# Patient Record
Sex: FEMALE | Race: WHITE | NOT HISPANIC OR LATINO | URBAN - METROPOLITAN AREA
[De-identification: names, ages, dates, MRNs, and addresses within clinical notes are randomized per-mention and may not be internally consistent; named-entity substitution may affect disease eponyms.]

---

## 2024-04-28 ENCOUNTER — EMERGENCY (EMERGENCY)
Facility: HOSPITAL | Age: 64
LOS: 1 days | Discharge: HOME | End: 2024-04-28
Attending: STUDENT IN AN ORGANIZED HEALTH CARE EDUCATION/TRAINING PROGRAM | Admitting: STUDENT IN AN ORGANIZED HEALTH CARE EDUCATION/TRAINING PROGRAM
Payer: COMMERCIAL

## 2024-04-28 VITALS
HEART RATE: 89 BPM | SYSTOLIC BLOOD PRESSURE: 134 MMHG | OXYGEN SATURATION: 98 % | DIASTOLIC BLOOD PRESSURE: 83 MMHG | TEMPERATURE: 98 F | RESPIRATION RATE: 18 BRPM

## 2024-04-28 VITALS
DIASTOLIC BLOOD PRESSURE: 56 MMHG | OXYGEN SATURATION: 97 % | TEMPERATURE: 98 F | SYSTOLIC BLOOD PRESSURE: 115 MMHG | HEART RATE: 65 BPM | RESPIRATION RATE: 17 BRPM

## 2024-04-28 LAB
ALBUMIN SERPL ELPH-MCNC: 3.6 G/DL — SIGNIFICANT CHANGE UP (ref 3.3–5)
ALP SERPL-CCNC: 91 U/L — SIGNIFICANT CHANGE UP (ref 40–120)
ALT FLD-CCNC: 15 U/L — SIGNIFICANT CHANGE UP (ref 4–33)
ANION GAP SERPL CALC-SCNC: 15 MMOL/L — HIGH (ref 7–14)
APTT BLD: 25.7 SEC — SIGNIFICANT CHANGE UP (ref 24.5–35.6)
AST SERPL-CCNC: 16 U/L — SIGNIFICANT CHANGE UP (ref 4–32)
BASOPHILS # BLD AUTO: 0.06 K/UL — SIGNIFICANT CHANGE UP (ref 0–0.2)
BASOPHILS NFR BLD AUTO: 0.6 % — SIGNIFICANT CHANGE UP (ref 0–2)
BILIRUB SERPL-MCNC: <0.2 MG/DL — SIGNIFICANT CHANGE UP (ref 0.2–1.2)
BUN SERPL-MCNC: 25 MG/DL — HIGH (ref 7–23)
CALCIUM SERPL-MCNC: 8.8 MG/DL — SIGNIFICANT CHANGE UP (ref 8.4–10.5)
CHLORIDE SERPL-SCNC: 104 MMOL/L — SIGNIFICANT CHANGE UP (ref 98–107)
CO2 SERPL-SCNC: 21 MMOL/L — LOW (ref 22–31)
CREAT SERPL-MCNC: 1.32 MG/DL — HIGH (ref 0.5–1.3)
EGFR: 45 ML/MIN/1.73M2 — LOW
EOSINOPHIL # BLD AUTO: 0.14 K/UL — SIGNIFICANT CHANGE UP (ref 0–0.5)
EOSINOPHIL NFR BLD AUTO: 1.4 % — SIGNIFICANT CHANGE UP (ref 0–6)
FLUAV AG NPH QL: SIGNIFICANT CHANGE UP
FLUBV AG NPH QL: SIGNIFICANT CHANGE UP
GLUCOSE SERPL-MCNC: 128 MG/DL — HIGH (ref 70–99)
HCT VFR BLD CALC: 33.2 % — LOW (ref 34.5–45)
HGB BLD-MCNC: 10.1 G/DL — LOW (ref 11.5–15.5)
IANC: 7.73 K/UL — HIGH (ref 1.8–7.4)
IMM GRANULOCYTES NFR BLD AUTO: 0.7 % — SIGNIFICANT CHANGE UP (ref 0–0.9)
INR BLD: 0.98 RATIO — SIGNIFICANT CHANGE UP (ref 0.85–1.18)
LYMPHOCYTES # BLD AUTO: 1.5 K/UL — SIGNIFICANT CHANGE UP (ref 1–3.3)
LYMPHOCYTES # BLD AUTO: 15.1 % — SIGNIFICANT CHANGE UP (ref 13–44)
MCHC RBC-ENTMCNC: 23.9 PG — LOW (ref 27–34)
MCHC RBC-ENTMCNC: 30.4 GM/DL — LOW (ref 32–36)
MCV RBC AUTO: 78.5 FL — LOW (ref 80–100)
MONOCYTES # BLD AUTO: 0.43 K/UL — SIGNIFICANT CHANGE UP (ref 0–0.9)
MONOCYTES NFR BLD AUTO: 4.3 % — SIGNIFICANT CHANGE UP (ref 2–14)
NEUTROPHILS # BLD AUTO: 7.73 K/UL — HIGH (ref 1.8–7.4)
NEUTROPHILS NFR BLD AUTO: 77.9 % — HIGH (ref 43–77)
NRBC # BLD: 0 /100 WBCS — SIGNIFICANT CHANGE UP (ref 0–0)
NRBC # FLD: 0 K/UL — SIGNIFICANT CHANGE UP (ref 0–0)
PLATELET # BLD AUTO: 338 K/UL — SIGNIFICANT CHANGE UP (ref 150–400)
POTASSIUM SERPL-MCNC: 3.5 MMOL/L — SIGNIFICANT CHANGE UP (ref 3.5–5.3)
POTASSIUM SERPL-SCNC: 3.5 MMOL/L — SIGNIFICANT CHANGE UP (ref 3.5–5.3)
PROT SERPL-MCNC: 6.8 G/DL — SIGNIFICANT CHANGE UP (ref 6–8.3)
PROTHROM AB SERPL-ACNC: 11.1 SEC — SIGNIFICANT CHANGE UP (ref 9.5–13)
RBC # BLD: 4.23 M/UL — SIGNIFICANT CHANGE UP (ref 3.8–5.2)
RBC # FLD: 15.9 % — HIGH (ref 10.3–14.5)
RSV RNA NPH QL NAA+NON-PROBE: SIGNIFICANT CHANGE UP
SARS-COV-2 RNA SPEC QL NAA+PROBE: SIGNIFICANT CHANGE UP
SODIUM SERPL-SCNC: 140 MMOL/L — SIGNIFICANT CHANGE UP (ref 135–145)
WBC # BLD: 9.93 K/UL — SIGNIFICANT CHANGE UP (ref 3.8–10.5)
WBC # FLD AUTO: 9.93 K/UL — SIGNIFICANT CHANGE UP (ref 3.8–10.5)

## 2024-04-28 PROCEDURE — 99285 EMERGENCY DEPT VISIT HI MDM: CPT

## 2024-04-28 PROCEDURE — 70496 CT ANGIOGRAPHY HEAD: CPT | Mod: 26,MC

## 2024-04-28 PROCEDURE — 93010 ELECTROCARDIOGRAM REPORT: CPT

## 2024-04-28 PROCEDURE — 70498 CT ANGIOGRAPHY NECK: CPT | Mod: 26,MC

## 2024-04-28 RX ORDER — METOCLOPRAMIDE HCL 10 MG
10 TABLET ORAL ONCE
Refills: 0 | Status: COMPLETED | OUTPATIENT
Start: 2024-04-28 | End: 2024-04-28

## 2024-04-28 RX ORDER — SODIUM CHLORIDE 9 MG/ML
1000 INJECTION INTRAMUSCULAR; INTRAVENOUS; SUBCUTANEOUS ONCE
Refills: 0 | Status: COMPLETED | OUTPATIENT
Start: 2024-04-28 | End: 2024-04-28

## 2024-04-28 RX ADMIN — SODIUM CHLORIDE 1000 MILLILITER(S): 9 INJECTION INTRAMUSCULAR; INTRAVENOUS; SUBCUTANEOUS at 17:35

## 2024-04-28 RX ADMIN — Medication 10 MILLIGRAM(S): at 17:34

## 2024-04-28 NOTE — ED PROVIDER NOTE - PATIENT PORTAL LINK FT
You can access the FollowMyHealth Patient Portal offered by [facility] by registering at the following website: [email or web address]. By joining [employer]’s FollowMyHealth portal, you will also be able to view your health information using other applications (apps) compatible with our system.

## 2024-04-28 NOTE — ED ADULT TRIAGE NOTE - CHIEF COMPLAINT QUOTE
pt living with DM type2, currently taking Z pack for upper respiratory infection c/o of diffuse abd pain with nausea and vomiting since this afternoon,

## 2024-04-28 NOTE — ED PROVIDER NOTE - CLINICAL SUMMARY MEDICAL DECISION MAKING FREE TEXT BOX
64 y/o female pmh htn, hld, dm, obesity c/o room spinning dizziness x today, with feeling like she is falling to her R side. DIzines started after sitting up form lying down and is worse with looking side to side. Pt admits to nausea and vomiting as well. Pt denies slurred speech, neck pain, change in vision, syncope, fever or chills. Pt is well appearing, nad, afebrile, no neuro deficits, [place] neg, finger to nose neg, pronator drift neg, able to ambulate on her own, no ataxia on exam. + L horizontal nystagmus- symptoms consistent with peripheral vertigo however due to age and risk factors the risk on a central etiology is high. WIll check labs, CTA head/neck, symptomatic control, neuro consult and reassess.

## 2024-04-28 NOTE — CONSULT NOTE ADULT - SUBJECTIVE AND OBJECTIVE BOX
~~~~~~~~~~~~~~~~~~~~~~~~~~~~~~~~~~~~~~~~~~~~~~~~~~  Neurology Service   [facility] Consult [phone], Spectra [id number]  [facility] General Neurology- Spectra [phone]  [facility] Epilepsy Monitoring Unit- Spectra   [facility] Stroke Service- spectra [phone]  [facility] Consult Service [phone], [phone]  [facility] Stroke Consult Service , [phone] (7a-7p, otherwise call [phone])  ~~~~~~~~~~~~~~~~~~~~~~~~~~~~~~~~~~~~~~~~~~~~~~~~~~    History:  HPI:   MINA JANG is a 63y  Woman with PMHx Asthma, COPD, HTN, DM, chronic sinusitis, LEFT ear pain/infection on azithromycin a/w LEFT cheek pain, RIGHT gangliocapsular lacunar (on aspirin and atorvastatin), who presents for vertigo now resolved. Ongoing LEFT ear in    note incomlete        Review of Systems:    INCOMPLETE    CONSTITUTIONAL: No fevers or chills  EYES AND ENT: No visual changes or no throat pain   NECK: No pain or stiffness  RESPIRATORY: No hemoptysis or shortness of breath  CARDIOVASCULAR: No chest pain or palpitations  GASTROINTESTINAL: No melena or hematochezia  GENITOURINARY: No dysuria or hematuria  NEUROLOGICAL: +As stated in HPI above  SKIN: No itching, burning, rashes, or lesions   All other review of systems is negative unless indicated above.    PMHx:  HTN (hypertension)  HLD (hyperlipidemia)  DM (diabetes mellitus)        Social History:  INCOMPLETE  Lives with:  Alcohol use:  Tobacco use:   Other drug use:  Profession:  ADLs: Independent / Dependent for ___    Medications  Home Medications:    MEDICATIONS  (STANDING):    MEDICATIONS  (PRN):      Exam:  Vitals:  T(C): 36.7 (04-28-24 @ 16:09), Max: 36.7 (04-28-24 @ 16:09)  T(F): 98 (04-28-24 @ 16:09), Max: 98 (04-28-24 @ 16:09)  HR: 89 (04-28-24 @ 16:09) (89 - 89)  BP: 134/83 (04-28-24 @ 16:09) (134/83 - 134/83)  RR: 18 (04-28-24 @ 16:09) (18 - 18)  SpO2: 98% (04-28-24 @ 16:09) (98% - 98%)  I&O's Summary      INCOMPLETE  Physical and Neurological Examination:   - General:    - Mental Status:   Alert, awake, oriented to person, age, place, and time  speech is fluent with intact naming, repetition, and follows 1-step and 3-step mid-line crossing commands  good overall fund of knowledge (aware of current events, relevant past history, and vocabulary appropriate for level of education)  immediate recall is 3/3 words and delayed recall is 3/3 words at 5 minutes; able to spell WORLD backwards and recite the days of the week in reverse  able to read a sentence.    - Cranial Nerves:   PERRL, VFF, EOMI, facial sensation is intact in the V1-V3 distribution bilaterally; face is symmetric with normal eye closure and smile; hearing is intact to finger rub bilaterally and equally; uvula is midline and soft palate rises symmetrically; shoulder shrug intact; tongue protrudes in the midline with intact lateral movements    -Eyes  No ptosis  PERRL, no afferent pupillary defect appreciated  EOMI without nystagmus, no dysconjugate gaze. No pain noted on EOM  No diplopia on primary or lateral gaze  Acuity: OS 20/20, OD 20/20, OU 20/20.  Red desaturation: None  Fundi: Not well visualized     - Motor Testing:  Bulk:  Tone:  Strength:  There is no pronator drift b/l  There is no leg drift b/l                              Right           Left  Should-Abduct      5                   5  Elbow-Flex             5                   5  Elbow-Ext              5                   5  Wrist-Flex              5                   5  Wrist-Ext               5                   5  Interossei              5                   5                        5                   5    Hip-Flex                 5                   5  Hip-Ext                  5                   5  Knee-Flex              5                   5  Knee-Ext                5                   5  Dorsiflex                5                   5  Plantarflex             5                   5    - Reflexes:              Right           Left  Triceps                     2+                2+  Biceps                      2+                 2+  Brachioradialis         2+                 2+  Patellar                    2+                 2+  Achilles                    2+                 2+  Plant responses down bilaterally.  There is no lyle bilaterally    - Sensory: Intact throughout to light touch.   - Coordination: Finger-nose-finger intact without dysmetria.    - Gait: Normal steps, base, arm swing, and turning.      Objective Studies  Labs:             10.1   9.93  )-----------( 338      ( 28 Apr 2024 17:28 )             33.2   140  |  104  |  25<H>  ----------------------------<  128<H>  3.5   |  21<L>  |  1.32<H>  Ca    8.8      28 Apr 2024 17:28  TPro  6.8  /  Alb  3.6  /  TBili  <0.2  /  DBili  x   /  AST  16  /  ALT  15  /  AlkPhos  91  04-28  PT/INR - ( 28 Apr 2024 17:28 )   PT: 11.1 sec;   INR: 0.98 ratio    PTT - ( 28 Apr 2024 17:28 )  PTT:25.7 sec  Lactate Trend  CAPILLARY BLOOD GLUCOSE  POCT Blood Glucose.: 149 mg/dL (28 Apr 2024 16:09)       CNS Imaging:  < from: CT Angio Neck w/ IV Cont (04.28.24 @ 19:32) >    ACC: [id number] EXAM:  CT ANGIO NECK (W)AW IC   ORDERED BY: SARBJIT COX     ACC: [id number] EXAM:  CT ANGIO BRAIN (W)AW IC   ORDERED BY: SARBJIT COX     PROCEDURE DATE:  04/28/2024          INTERPRETATION:  CLINICAL HISTORY: Dizziness.    TECHNIQUE:  Noncontrast head CT was followed by CT images acquired through the  neck   and head  during the arterial phase.  Three-dimensional MIP reformats were generated.  Additional postcontrast CT through the head was obtained.    CONTRAST/COMPLICATIONS:  IV Contrast: Omnipaque 350  94 cc administered   6 cc discarded  Complications: None reported at time of study completion    COMPARISON STUDY: Noncontrast CT head of 6/20/2010.    FINDINGS:    NONCONTRAST CT HEAD:    There is no acute intracranial hemorrhage, mass effect, midline shift,   extra-axial collection, hydrocephalus, or evidence of acute vascular   territorial infarction. Chronic appearing right gangliocapsular lacunar   infarct. Mild patchy hypodensities within the periventricular and   subcortical white matter, although nonspecific, likely reflect chronic   microvascular disease. Cerebral volume loss contributes to prominence of   the ventricles and sulci.    The visualized paranasal sinuses and mastoid air cells are clear.   Visualized osseous structures are intact.    CT ANGIOGRAPHY NECK:    Thoracic aorta and branch vessels: Patent.  No atherosclerosis.  No   flow-limiting stenosis.  No evidence of dissection.    Right carotid system: Patent.  No atherosclerosis.  No hemodynamically   significant stenosis using NASCET criteria.  No evidence of dissection.    Left carotid system: Patent. Atherosclerotic calcifications at the   carotid bifurcation.  No hemodynamically significant stenosis using   NASCET criteria.  No evidence of dissection.    Vertebral arteries: Patent.  No atherosclerosis.  No flow-limiting   stenosis.  No evidence of dissection.    Soft tissues of the neck: Calcified right thyroid lobe nodules measuring   up to 1.5 cm, evaluation of which is limited by streak artifact.    Visualized spine: Multilevel degenerative change.    Visualized upper chest: Unremarkable.    CT ANGIOGRAPHY BRAIN:    Internal carotid arteries: Patent bilaterally. Atherosclerotic   calcifications. No flow limiting stenosis.    Anterior cerebral arteries: Patent bilaterally without flow limiting   stenosis.    Middle cerebral arteries: Patent bilaterally without flow limiting   stenosis.    Anterior communicating artery: Visualized.    Right posterior communicating artery: Not Visualized.    Left posterior communicating artery: Not Visualized.    Posterior cerebral arteries: Patent bilaterally without stenosis.    Vertebrobasilar: Patent without stenosis. The distal vertebral arteries   are similar in caliber.  Bilateral posterior inferior cerebellar arteries   and bilateral superior cerebellar arteries are visualized.    Vascular lesions: No evidence of intracranial aneurysm within limits of   CTA technique.  Tiny aneurysms may be beyond the resolution of this   examination.    Dural venous sinuses: Grossly patent.    IMPRESSION:    Noncontrast CT Head: No acute intracranial hemorrhage, mass effect, or   evidence of acute vascular territorial infarct. If clinical symptoms   persist or worsen, more sensitive evaluation with brain MRI may be   obtained, if no contraindications exist.    CTA Neck: No significant flow-limiting stenosis or evidence of acute   dissection within the cervical carotid or vertebral arteries.    CTA Head: No proximal large vessel occlusion or significant stenosis.    --- End of Report ---            NATHAN KEITH MD; Attending Radiologist  This document has been electronically signed. Apr 28 2024  7:46PM    < end of copied text >      EEGs:    TTE:    NIF/VC:    Other:     ~~~~~~~~~~~~~~~~~~~~~~~~~~~~~~~~~~~~~~~~~~~~~~~~~~  Neurology Service   [facility] Consult [phone], Spectra [phone]  [facility] General Neurology- Spectra [id number]  [facility] Epilepsy Monitoring Unit- Spectra   [facility] Stroke Service- spectra [phone]  [facility] Consult Service [phone], [phone]  [facility] Stroke Consult Service , [phone] (7a-7p, otherwise call [phone])  ~~~~~~~~~~~~~~~~~~~~~~~~~~~~~~~~~~~~~~~~~~~~~~~~~~    History:  HPI:   MINA JANG is a 63y  Woman with PMHx Asthma, COPD, HTN, DM, chronic sinusitis, LEFT ear pain/infection on azithromycin a/w LEFT cheek pain, RIGHT gangliocapsular lacunar (on aspirin and atorvastatin), who presents for vertigo now resolved. Ongoing LEFT ear infection for several weeks a/w cheek pain on abx. 4/28/24 ~1500 got up form cough and had room spinning sensation, leaning to the RIGHT c/o LEFT body shaking which she describes as a  tremor Persisted 60-90 minutes and unable to walk at that time now improved requiesting to go home. Hx of vertigo lasting a few seconds intermittently for years. Notes LEFT ear and cheek pain.    ED course: VSS, orthostatics negative. Tx w/ NS and metoclopramide.     Review of Systems:    CONSTITUTIONAL: No fevers or chills  EYES AND ENT: No visual changes or no throat pain   NECK: No pain or stiffness  RESPIRATORY: No hemoptysis or shortness of breath  CARDIOVASCULAR: No chest pain or palpitations  GASTROINTESTINAL: No melena or hematochezia  GENITOURINARY: No dysuria or hematuria  NEUROLOGICAL: +As stated in HPI above  SKIN: No itching, burning, rashes, or lesions   All other review of systems is negative unless indicated above.    PMHx:  HTN (hypertension)  HLD (hyperlipidemia)  DM (diabetes mellitus)      Social History:  denies t/e/d   9 months ago        Exam:  Vitals:  T(C): 36.7 (04-28-24 @ 16:09), Max: 36.7 (04-28-24 @ 16:09)  T(F): 98 (04-28-24 @ 16:09), Max: 98 (04-28-24 @ 16:09)  HR: 89 (04-28-24 @ 16:09) (89 - 89)  BP: 134/83 (04-28-24 @ 16:09) (134/83 - 134/83)  RR: 18 (04-28-24 @ 16:09) (18 - 18)  SpO2: 98% (04-28-24 @ 16:09) (98% - 98%)  I&O's Summary    Physical and Neurological Examination:   - General: NAD    - Mental Status:   Alert, awake, oriented to person, age, place, and time  speech is fluent with intact naming, repetition, and follows 1-step and 3-step mid-line crossing commands  good overall fund of knowledge (aware of current events, relevant past history, and vocabulary appropriate for level of education)    - Cranial Nerves:   PERRL, VFF, EOMI with right and right rotary nystagmus worse on right gaze, facial sensation is intact in the V1-V3 distribution bilaterally except for around left ear where sensation is intact but pain is noted; face is symmetric with normal smile; hearing is intact to finger rub bilaterally and equally; uvula is midline and soft palate rises symmetrically; shoulder shrug intact; tongue protrudes in the midline with intact lateral movements. Fundi not well visualized on nondilated exam in bright ED.    - Motor Testing:  Bulk: Normal  Tone: Normal  Strength:  There is no pronator drift b/l                              Right           Left  Should-Abduct      5                   5  Elbow-Flex             5                   5  Elbow-Ext              5                   5                        5                   5    Hip-Flex                 5                   5  Knee-Flex              5                   5  Knee-Ext                5                   5  Dorsiflex                5                   5  Plantarflex             5                   5    - Reflexes:              Right           Left  Triceps                     2+                2+  Biceps                      2+                 2+  Brachioradialis         2+                 2+  Patellar                    2+                 2+  Achilles                    2+                 2+    - Sensory: Intact throughout to light touch.   - Coordination: Finger-nose-finger intact without dysmetria.  Intact rapid finger rapping, intact rapid alt movement  - Gait: Normal steps, base, arm swing, and turning.      Objective Studies  Labs:             10.1   9.93  )-----------( 338      ( 28 Apr 2024 17:28 )             33.2   140  |  104  |  25<H>  ----------------------------<  128<H>  3.5   |  21<L>  |  1.32<H>  Ca    8.8      28 Apr 2024 17:28  TPro  6.8  /  Alb  3.6  /  TBili  <0.2  /  DBili  x   /  AST  16  /  ALT  15  /  AlkPhos  91  04-28  PT/INR - ( 28 Apr 2024 17:28 )   PT: 11.1 sec;   INR: 0.98 ratio    PTT - ( 28 Apr 2024 17:28 )  PTT:25.7 sec  Lactate Trend  CAPILLARY BLOOD GLUCOSE  POCT Blood Glucose.: 149 mg/dL (28 Apr 2024 16:09)       CNS Imaging:  < from: CT Angio Neck w/ IV Cont (04.28.24 @ 19:32) >    ACC: [id number] EXAM:  CT ANGIO NECK (W)AW IC   ORDERED BY: SARBJIT COX     ACC: [id number] EXAM:  CT ANGIO BRAIN (W)AW IC   ORDERED BY: SARBJIT COX     PROCEDURE DATE:  04/28/2024          INTERPRETATION:  CLINICAL HISTORY: Dizziness.    TECHNIQUE:  Noncontrast head CT was followed by CT images acquired through the  neck   and head  during the arterial phase.  Three-dimensional MIP reformats were generated.  Additional postcontrast CT through the head was obtained.    CONTRAST/COMPLICATIONS:  IV Contrast: Omnipaque 350  94 cc administered   6 cc discarded  Complications: None reported at time of study completion    COMPARISON STUDY: Noncontrast CT head of 6/20/2010.    FINDINGS:    NONCONTRAST CT HEAD:    There is no acute intracranial hemorrhage, mass effect, midline shift,   extra-axial collection, hydrocephalus, or evidence of acute vascular   territorial infarction. Chronic appearing right gangliocapsular lacunar   infarct. Mild patchy hypodensities within the periventricular and   subcortical white matter, although nonspecific, likely reflect chronic   microvascular disease. Cerebral volume loss contributes to prominence of   the ventricles and sulci.    The visualized paranasal sinuses and mastoid air cells are clear.   Visualized osseous structures are intact.    CT ANGIOGRAPHY NECK:    Thoracic aorta and branch vessels: Patent.  No atherosclerosis.  No   flow-limiting stenosis.  No evidence of dissection.    Right carotid system: Patent.  No atherosclerosis.  No hemodynamically   significant stenosis using NASCET criteria.  No evidence of dissection.    Left carotid system: Patent. Atherosclerotic calcifications at the   carotid bifurcation.  No hemodynamically significant stenosis using   NASCET criteria.  No evidence of dissection.    Vertebral arteries: Patent.  No atherosclerosis.  No flow-limiting   stenosis.  No evidence of dissection.    Soft tissues of the neck: Calcified right thyroid lobe nodules measuring   up to 1.5 cm, evaluation of which is limited by streak artifact.    Visualized spine: Multilevel degenerative change.    Visualized upper chest: Unremarkable.    CT ANGIOGRAPHY BRAIN:    Internal carotid arteries: Patent bilaterally. Atherosclerotic   calcifications. No flow limiting stenosis.    Anterior cerebral arteries: Patent bilaterally without flow limiting   stenosis.    Middle cerebral arteries: Patent bilaterally without flow limiting   stenosis.    Anterior communicating artery: Visualized.    Right posterior communicating artery: Not Visualized.    Left posterior communicating artery: Not Visualized.    Posterior cerebral arteries: Patent bilaterally without stenosis.    Vertebrobasilar: Patent without stenosis. The distal vertebral arteries   are similar in caliber.  Bilateral posterior inferior cerebellar arteries   and bilateral superior cerebellar arteries are visualized.    Vascular lesions: No evidence of intracranial aneurysm within limits of   CTA technique.  Tiny aneurysms may be beyond the resolution of this   examination.    Dural venous sinuses: Grossly patent.    IMPRESSION:    Noncontrast CT Head: No acute intracranial hemorrhage, mass effect, or   evidence of acute vascular territorial infarct. If clinical symptoms   persist or worsen, more sensitive evaluation with brain MRI may be   obtained, if no contraindications exist.    CTA Neck: No significant flow-limiting stenosis or evidence of acute   dissection within the cervical carotid or vertebral arteries.    CTA Head: No proximal large vessel occlusion or significant stenosis.    --- End of Report ---            NATHAN KEITH MD; Attending Radiologist  This document has been electronically signed. Apr 28 2024  7:46PM    < end of copied text >      EEGs:    TTE:    NIF/VC:    Other:     ~~~~~~~~~~~~~~~~~~~~~~~~~~~~~~~~~~~~~~~~~~~~~~~~~~  Neurology Service   [facility] Consult [phone], Spectra [id number]  [facility] General Neurology- Spectra [phone]  [facility] Epilepsy Monitoring Unit- Spectra   [facility] Stroke Service- spectra [phone]  [facility] Consult Service [phone], [phone]  [facility] Stroke Consult Service , Spectra-[phone] (7a-7p, otherwise call [phone])  ~~~~~~~~~~~~~~~~~~~~~~~~~~~~~~~~~~~~~~~~~~~~~~~~~~    History:  HPI:   MINA JANG is a 63y  Woman with PMHx Asthma, COPD, HTN, DM, chronic sinusitis, LEFT ear pain/infection on azithromycin a/w LEFT cheek pain, RIGHT gangliocapsular lacunar (on aspirin and atorvastatin), who presents for vertigo now resolved. Ongoing LEFT ear infection for several weeks a/w cheek pain on abx. 4/28/24 ~1500 got up form cough and had room spinning sensation, leaning to the RIGHT c/o LEFT body shaking which she describes as a  tremor Persisted 60-90 minutes and unable to walk at that time now improved requiesting to go home. Hx of vertigo lasting a few seconds intermittently for years. Notes LEFT ear and cheek pain.    ED course: VSS, orthostatics negative. Tx w/ NS and metoclopramide.     Review of Systems:    CONSTITUTIONAL: No fevers or chills  EYES AND ENT: No visual changes or no throat pain   NECK: No pain or stiffness  RESPIRATORY: No hemoptysis or shortness of breath  CARDIOVASCULAR: No chest pain or palpitations  GASTROINTESTINAL: No melena or hematochezia  GENITOURINARY: No dysuria or hematuria  NEUROLOGICAL: +As stated in HPI above  SKIN: No itching, burning, rashes, or lesions   All other review of systems is negative unless indicated above.    PMHx:  HTN (hypertension)  HLD (hyperlipidemia)  DM (diabetes mellitus)      Social History:  denies t/e/d   9 months ago        Exam:  Vitals:  T(C): 36.7 (04-28-24 @ 16:09), Max: 36.7 (04-28-24 @ 16:09)  T(F): 98 (04-28-24 @ 16:09), Max: 98 (04-28-24 @ 16:09)  HR: 89 (04-28-24 @ 16:09) (89 - 89)  BP: 134/83 (04-28-24 @ 16:09) (134/83 - 134/83)  RR: 18 (04-28-24 @ 16:09) (18 - 18)  SpO2: 98% (04-28-24 @ 16:09) (98% - 98%)  I&O's Summary    Physical and Neurological Examination:   - General: NAD    - Mental Status:   Alert, awake, oriented to person, age, place, and time  speech is fluent with intact naming, repetition, and follows 1-step and 3-step mid-line crossing commands  good overall fund of knowledge (aware of current events, relevant past history, and vocabulary appropriate for level of education)    - Cranial Nerves:   PERRL, VFF, EOMI with right and right rotary nystagmus worse on right gaze, facial sensation is intact in the V1-V3 distribution bilaterally except for around left ear where sensation is intact but pain is noted; face is symmetric with normal smile; hearing is intact to finger rub bilaterally and equally; uvula is midline and soft palate rises symmetrically; shoulder shrug intact; tongue protrudes in the midline with intact lateral movements. Fundi not well visualized on nondilated exam in bright ED.    - Motor Testing:  Bulk: Normal  Tone: Normal  Strength:  There is no pronator drift b/l                              Right           Left  Should-Abduct      5                   5  Elbow-Flex             5                   5  Elbow-Ext              5                   5                        5                   5    Hip-Flex                 5                   5  Knee-Flex              5                   5  Knee-Ext                5                   5  Dorsiflex                5                   5  Plantarflex             5                   5    - Reflexes:              Right           Left  Triceps                     2+                2+  Biceps                      2+                 2+  Brachioradialis         2+                 2+  Patellar                    2+                 2+  Achilles                    2+                 2+    - Sensory: Intact throughout to light touch.   - Coordination: Finger-nose-finger intact without dysmetria.  Intact rapid finger rapping, intact rapid alt movement. HKS intact.   - Gait: Normal steps, base, arm swing, and turning.      Objective Studies  Labs:             10.1   9.93  )-----------( 338      ( 28 Apr 2024 17:28 )             33.2   140  |  104  |  25<H>  ----------------------------<  128<H>  3.5   |  21<L>  |  1.32<H>  Ca    8.8      28 Apr 2024 17:28  TPro  6.8  /  Alb  3.6  /  TBili  <0.2  /  DBili  x   /  AST  16  /  ALT  15  /  AlkPhos  91  04-28  PT/INR - ( 28 Apr 2024 17:28 )   PT: 11.1 sec;   INR: 0.98 ratio    PTT - ( 28 Apr 2024 17:28 )  PTT:25.7 sec  Lactate Trend  CAPILLARY BLOOD GLUCOSE  POCT Blood Glucose.: 149 mg/dL (28 Apr 2024 16:09)       CNS Imaging:  < from: CT Angio Neck w/ IV Cont (04.28.24 @ 19:32) >    ACC: [id number] EXAM:  CT ANGIO NECK (W)AW IC   ORDERED BY: SARBJIT COX     ACC: [id number] EXAM:  CT ANGIO BRAIN (W)AW IC   ORDERED BY: SARBJIT COX     PROCEDURE DATE:  04/28/2024          INTERPRETATION:  CLINICAL HISTORY: Dizziness.    TECHNIQUE:  Noncontrast head CT was followed by CT images acquired through the  neck   and head  during the arterial phase.  Three-dimensional MIP reformats were generated.  Additional postcontrast CT through the head was obtained.    CONTRAST/COMPLICATIONS:  IV Contrast: Omnipaque 350  94 cc administered   6 cc discarded  Complications: None reported at time of study completion    COMPARISON STUDY: Noncontrast CT head of 6/20/2010.    FINDINGS:    NONCONTRAST CT HEAD:    There is no acute intracranial hemorrhage, mass effect, midline shift,   extra-axial collection, hydrocephalus, or evidence of acute vascular   territorial infarction. Chronic appearing right gangliocapsular lacunar   infarct. Mild patchy hypodensities within the periventricular and   subcortical white matter, although nonspecific, likely reflect chronic   microvascular disease. Cerebral volume loss contributes to prominence of   the ventricles and sulci.    The visualized paranasal sinuses and mastoid air cells are clear.   Visualized osseous structures are intact.    CT ANGIOGRAPHY NECK:    Thoracic aorta and branch vessels: Patent.  No atherosclerosis.  No   flow-limiting stenosis.  No evidence of dissection.    Right carotid system: Patent.  No atherosclerosis.  No hemodynamically   significant stenosis using NASCET criteria.  No evidence of dissection.    Left carotid system: Patent. Atherosclerotic calcifications at the   carotid bifurcation.  No hemodynamically significant stenosis using   NASCET criteria.  No evidence of dissection.    Vertebral arteries: Patent.  No atherosclerosis.  No flow-limiting   stenosis.  No evidence of dissection.    Soft tissues of the neck: Calcified right thyroid lobe nodules measuring   up to 1.5 cm, evaluation of which is limited by streak artifact.    Visualized spine: Multilevel degenerative change.    Visualized upper chest: Unremarkable.    CT ANGIOGRAPHY BRAIN:    Internal carotid arteries: Patent bilaterally. Atherosclerotic   calcifications. No flow limiting stenosis.    Anterior cerebral arteries: Patent bilaterally without flow limiting   stenosis.    Middle cerebral arteries: Patent bilaterally without flow limiting   stenosis.    Anterior communicating artery: Visualized.    Right posterior communicating artery: Not Visualized.    Left posterior communicating artery: Not Visualized.    Posterior cerebral arteries: Patent bilaterally without stenosis.    Vertebrobasilar: Patent without stenosis. The distal vertebral arteries   are similar in caliber.  Bilateral posterior inferior cerebellar arteries   and bilateral superior cerebellar arteries are visualized.    Vascular lesions: No evidence of intracranial aneurysm within limits of   CTA technique.  Tiny aneurysms may be beyond the resolution of this   examination.    Dural venous sinuses: Grossly patent.    IMPRESSION:    Noncontrast CT Head: No acute intracranial hemorrhage, mass effect, or   evidence of acute vascular territorial infarct. If clinical symptoms   persist or worsen, more sensitive evaluation with brain MRI may be   obtained, if no contraindications exist.    CTA Neck: No significant flow-limiting stenosis or evidence of acute   dissection within the cervical carotid or vertebral arteries.    CTA Head: No proximal large vessel occlusion or significant stenosis.    --- End of Report ---            NATHAN KEITH MD; Attending Radiologist  This document has been electronically signed. Apr 28 2024  7:46PM    < end of copied text >      EEGs:    TTE:    NIF/VC:    Other:

## 2024-04-28 NOTE — ED PROVIDER NOTE - NSFOLLOWUPINSTRUCTIONS_ED_ALL_ED_FT
Benign Paroxysmal Positional Vertigo    WHAT YOU NEED TO KNOW:    What is benign paroxysmal positional vertigo (BPPV)? BPPV is an inner ear condition that causes you to suddenly feel dizzy. Benign means it is not serious or life-threatening. BPPV is caused by a problem with the nerves and structure of your inner ear. BPPV happens when small pieces of calcium break loose and lump together in one of your inner ear canals.  Ear Anatomy    What are the signs and symptoms of BPPV? You may feel that you or the room is moving or spinning. Turning your head, rolling over in bed, getting up or lying down may lead to sudden vertigo. You may also have any of the following symptoms:    Nystagmus (quick shaky eye movement that you cannot control)    Nausea    Poor balance and feeling unsteady when you walk  What increases my risk for BPPV?    Older age    An injury or trauma to your head or neck    Frequent ear infections    Long-term bed rest    A medical condition such as diabetes, high blood pressure, migraine headaches, or Ménière disease  How is BPPV diagnosed? Your healthcare provider will ask about your symptoms and examine you. Your healthcare provider can usually determine if you have BPPV by doing a few simple tests. He or she may have you move your head or body in certain ways. Tell him or her if you feel dizzy or nauseated during these movements.    How is BPPV managed?    Your healthcare provider will teach you how to move your head and body to prevent symptoms. For example, he or she may teach you certain ways to move your head or body. These movements usually help relieve your symptoms and keep the dizziness from returning. The exercises help move the calcium pieces to a different part of your ear. Do the movements only as directed.    Vestibular and balance rehabilitation therapy (VBRT) is used to teach you exercises to improve your balance and strength. VBRT may help decrease your dizziness and prevent injuries if you are at risk for falls.    Medicines may be recommended or prescribed to treat dizziness or nausea.  How can I help prevent my symptoms?    Try to avoid sudden head movements. Stand up and lie down slowly.    Raise and support your head when you lie down. Place pillows under your upper back and head or rest in a recliner.    Change your position often when you are lying down. Try not to lie with your head on the same side for long periods of time. Roll over slowly.    Wear protective gear when you ride a bike or play sports. A helmet helps protect your head from injury.  When should I seek immediate care?    You fall during a BPPV episode and are injured.    You have a severe headache that does not go away.    You have new changes in your vision or feel weak or confused.    You have problems hearing, or you have ringing or buzzing in your ears.  When should I contact my healthcare provider?    Your BPPV symptoms do not go away or they return.    You have problems with your balance, or you are falling often.    You have new or increased nausea or vomiting with vertigo.    You feel anxious or depressed and do not want to leave your home.    You have questions or concerns about your condition or care.  CARE AGREEMENT:    You have the right to help plan your care. Learn about your health condition and how it may be treated. Discuss treatment options with your healthcare providers to decide what care you want to receive. You always have the right to refuse treatment.    Patient education resource: [email or web address]-[email or web address]

## 2024-04-28 NOTE — ED PROVIDER NOTE - NSFOLLOWUPCLINICS_GEN_ALL_ED_FT
[facility]  Neurology Auto[facility] Encephalitis  [address] [place] [address]  Phone: [phone]  Fax: [phone]

## 2024-04-28 NOTE — ED PROVIDER NOTE - OBJECTIVE STATEMENT
64 y/o female pmh htn, hld, dm, copd, obesity c/o dizziness x today. PT sat up on the cough and felt like she was falling to the right side. Pt tried to walk to the bathroom and felt the same way, having to support herself on the walls. Pt then had several episodes of emesis, nbnb. Pt also admit sto mild headache. Pt states that she has room spinning dizziness, worse with looking side to side. Pt denies cehst pain, sob, diarrhea, dysuria, numbness, tingling, weakness, neck pain, change in vision, syncope, fever or chills.

## 2024-04-28 NOTE — CONSULT NOTE ADULT - ASSESSMENT
Impression: Peripheral Vertigo, improved    Recommendations  []No further inpatient/emergent neurological workup/indicated. Neurology f/u outpatient; Patient can follow up with general neurology at [address] [place] [address] 1-2 weeks after discharge. Please instruct the patient to call [phone] to schedule this appointment.   [] Meclizine 12.5mg TID PRN (can take additional dose if refractory up to 50mg TID)  [] refer for Vestibular Rehab  [] ENT f/u outpatient  []Patient education resource: [email or web address]-[email or web address]    Discussed with attending   Prelim note to expedite recs/dischage. Recs communicated with primary team. Full note to follow Assessment:     VSS with negative orthostatics. Exam with right and right rotary nystagmus worse on right gaze. No cerebellar signs.   Labs with microcytic anemia new since 2013. Elevated sCr new since 2013. CTH with Chronic appearing right gangliocapsular lacunar infarct which patient reports she has been informed about previously by a neurologist on [facility]. Also with. Mild patchy hypodensities within the periventricular and subcortical white matter, although nonspecific, likely reflect chronic microvascular disease. Cerebral volume loss contributes to prominence of the ventricles and sulci. CTA left carotid bifurcation atherosclerosis. Calcified right thyroid nodule 1.cm. Degen changes in c spine.     Impression: Peripheral Vertigo, improved    Recommendations  []Anemia, elevated sCr, thyroid finding per primary   []No further inpatient/emergent neurological workup/indicated. Neurology f/u outpatient; Patient can follow up with general neurology at [address] [place] [address] 1-2 weeks after discharge. Please instruct the patient to call [phone] to schedule this appointment.   [] Meclizine 12.5mg TID PRN (can take additional dose if refractory up to 50mg TID)  [] refer for Vestibular Rehab  [] ENT f/u outpatient  []Patient education resource: [email or web address]-[email or web address]    Discussed with attending   Prelim note to expedite recs/dischage. Recs communicated with primary team. Full note to follow Assessment:   63W PMHx Asthma, COPD, HTN, DM, chronic sinusitis, Current LEFT ear pain/infection on azithromycin a/w LEFT cheek pain, RIGHT gangliocapsular lacunar (on aspirin and atorvastatin), who presents for vertigo now resolved. Tx in ED with NS 1L and metoclopramide VSS with negative orthostatics. Exam with right and right rotary nystagmus worse on right gaze. No cerebellar signs. Labs with microcytic anemia new since 2013. Elevated sCr new since 2013. CTH with Chronic appearing right gangliocapsular lacunar infarct which patient reports she has been informed about previously by a neurologist on [facility]. Also with. Mild patchy hypodensities within the periventricular and subcortical white matter, although nonspecific, likely reflect chronic microvascular disease. Cerebral volume loss contributes to prominence of the ventricles and sulci. CTA left carotid bifurcation atherosclerosis. Calcified right thyroid nodule 1.cm. Degen changes in c spine.     Impression: Peripheral Vertigo, improved    Recommendations  []Anemia, elevated sCr, thyroid finding per primary   []No further inpatient/emergent neurological workup/indicated. Neurology f/u outpatient; Patient can follow up with general neurology at [address] [place] [address] 1-2 weeks after discharge. Please instruct the patient to call [phone] to schedule this appointment.   []Meclizine 12.5mg TID PRN (can take additional dose if refractory up to 50mg TID)  []refer for Vestibular Rehab  []ENT f/u outpatient  []Patient education resource: [email or web address]-[email or web address]    Discussed with attending    Assessment:   63W PMHx Asthma, COPD, HTN, DM, chronic sinusitis, Current LEFT ear pain/infection on azithromycin a/w LEFT cheek pain, RIGHT gangliocapsular lacunar (on aspirin and atorvastatin), who presents for vertigo now resolved. Tx in ED with NS 1L and metoclopramide VSS with negative orthostatics. Exam with right and right rotary nystagmus worse on right gaze. No cerebellar signs. Labs with microcytic anemia new since 2013. Elevated sCr new since 2013. CTH with Chronic appearing right gangliocapsular lacunar infarct which patient reports she has been informed about previously by a neurologist on [facility]. Also with. Mild patchy hypodensities within the periventricular and subcortical white matter, although nonspecific, likely reflect chronic microvascular disease. Cerebral volume loss contributes to prominence of the ventricles and sulci. CTA left carotid bifurcation atherosclerosis. Calcified right thyroid nodule 1.cm. Degen changes in c spine.     Impression: Peripheral Vertigo, improved    Recommendations  []Anemia, elevated sCr, thyroid finding per primary   []No further inpatient/emergent neurological workup/indicated. Neurology f/u outpatient; Patient can follow up with general neurology at [address] [place] [address] 1-2 weeks after discharge. Please instruct the patient to call [phone] to schedule this appointment.   []Meclizine 12.5mg TID PRN (can take additional dose if refractory up to 50mg TID)  []refer for Vestibular Rehab  []ENT f/u outpatient  []Patient education resource: [email or web address]-[email or web address]-[email or web address]/    Discussed with attending     Attending Attestation:  The patient left the hospital prior to being seen by me.

## 2024-04-28 NOTE — ED ADULT NURSE NOTE - NSFALLHARMRISKINTERV_ED_ALL_ED
Assistance OOB with selected safe patient handling equipment if applicable/Assistance with ambulation/Communicate risk of Fall with Harm to all staff, patient, and family/Monitor gait and stability/Provide visual cue: red socks, yellow wristband, yellow gown, etc/Reinforce activity limits and safety measures with patient and family/Use of alarms - bed, stretcher, chair and/or video monitoring/Bed in lowest position, wheels locked, appropriate side rails in place/Call bell, personal items and telephone in reach/Instruct patient to call for assistance before getting out of bed/chair/stretcher/Non-slip footwear applied when patient is off stretcher/[place] to call system/Physically safe environment - no spills, clutter or unnecessary equipment/Purposeful Proactive Rounding/Room/bathroom lighting operational, light cord in reach

## 2024-04-28 NOTE — ED PROVIDER NOTE - PROGRESS NOTE DETAILS
JEFRY Talavera- Pt feeling better after ER stay, Pt seen and eval by neuro ,cleared for dc. WIll dc on meclizine, vestibular therapy.

## 2024-04-28 NOTE — ED PROVIDER NOTE - ATTENDING APP SHARED VISIT CONTRIBUTION OF CARE
I performed a face to face evaluation of this patient and obtained a history and performed a full exam.  I agree with the history, physical exam and plan of the DANYEL

## 2024-04-28 NOTE — ED ADULT NURSE NOTE - OBJECTIVE STATEMENT
Received pt in bed A and OX  3in NAD resting comfortably, PERRLA extremities are strong and equal B/l. pt reports had a syncopal episode today and felt generalized weakness with episode fo N/V. some emesis noted on pt's clothing. pt denies abd pain. abd distended but soft non tender, skin color appropriate for ethnicity, orders noted and completed.

## 2024-04-30 PROBLEM — E78.5 HYPERLIPIDEMIA, UNSPECIFIED: Chronic | Status: ACTIVE | Noted: 2024-04-28

## 2024-04-30 PROBLEM — I10 ESSENTIAL (PRIMARY) HYPERTENSION: Chronic | Status: ACTIVE | Noted: 2024-04-28

## 2024-04-30 PROBLEM — E11.9 TYPE 2 DIABETES MELLITUS WITHOUT COMPLICATIONS: Chronic | Status: ACTIVE | Noted: 2024-04-28

## 2024-04-30 RX ORDER — MECLIZINE HCL 12.5 MG
1 TABLET ORAL
Qty: 15 | Refills: 0
Start: 2024-04-30 | End: 2024-05-04

## 2024-04-30 NOTE — ED POST DISCHARGE NOTE - ADDITIONAL DOCUMENTATION
JEFRY Calvo: Pt called, states prescription is not at her pharmacy, ED note documents meclizine to be sent to pts pharmacy, sent rx for meclizine. Pt will return to the ER with any worsening or concerning symptoms,

## 2024-05-20 ENCOUNTER — APPOINTMENT (OUTPATIENT)
Dept: NEUROLOGY | Facility: CLINIC | Age: 64
End: 2024-05-20
Payer: COMMERCIAL

## 2024-05-20 VITALS
WEIGHT: 265 LBS | DIASTOLIC BLOOD PRESSURE: 76 MMHG | SYSTOLIC BLOOD PRESSURE: 135 MMHG | HEART RATE: 80 BPM | BODY MASS INDEX: 44.15 KG/M2 | HEIGHT: 65 IN

## 2024-05-20 DIAGNOSIS — R42 DIZZINESS AND GIDDINESS: ICD-10-CM

## 2024-05-20 PROCEDURE — 99205 OFFICE O/P NEW HI 60 MIN: CPT

## 2024-05-20 NOTE — PHYSICAL EXAM
[FreeTextEntry1] : PHYSICAL EXAM Constitutional: Alert, no acute distress  Psychiatric: appropriate affect and mood Pulmonary: No respiratory distress, stable on room air  NEUROLOGICAL EXAM Mental status: The patient is alert, attentive and conversational memory intact. Speech/language: No dysarthria Cranial nerves: CN II: Visual fields are full to confrontation. Pupil size equal and briskly reactive to light.  CN III, IV, VI: EOMI, no nystagmus, no ptosis CN V: Facial sensation is intact to pinprick in all 3 divisions bilaterally. CN VII: Face is symmetric with normal eye closure and smile. CN VII: Hearing is normal to rubbing fingers CN IX, X: Palate elevates symmetrically.  CN XI: Head turning and shoulder shrug are intact CN XII: Tongue is midline with normal movements and no atrophy. Motor: Strength is full bilaterally. 5/5 muscle power in bilateral UE and LE. Reflexes: R        L  Biceps    1+       1+  Patellar   0       0  Achilles  0       0 Plantar responses- R down, L down Sensory: LT sensation intact UE and LE Coordination/Cerebellar: There is no dysmetria on finger-to-nose and heel to shin.  Gait/Stance: Posture is normal. Gait is steady with normal steps, base, arm swing, and turning. Tandem gait is normal. Romberg is negative.

## 2024-05-20 NOTE — HISTORY OF PRESENT ILLNESS
[FreeTextEntry1] : HPI (initial visit May 20, 2024)- MINA JANG is a 64 year old woman w/ hx of Anxiety/Depression, COPD, DM, GERD, HLD, hypothyroidism, chronic sinusitis referred for vertigo.   She was seen at [facility] ED 4/28 for vertigo. ED records reviewed.  Per report, pt was recently diagnosed left ear infection and was on azithromycin.  Pt woke up 4/28 and noted room spinning sensation and leaning to the left. She also reports a left arm tremor that lasted 1 hour. + vomiting.  She was evaluated by neurology in ED. Exam reportedly with right and right rotary nystagmus worse on right gaze. No cerebellar signs. CTH with chronic R basal ganglia infarct and mild microvascular ischemic changes otherwise neg for acute pathology. CTA H/N neg for significant vessel stenosis or occlusion. Her symptoms had resolved in ED. Pt diagnosed with peripheral vertigo.   Patient denies any vertigo since discharge from hospital but does report feeling a little woozy and nauseous. No falls. No diplopia. She has had a throbbing occipital headache that is now improving. No diplopia or vision loss.   She also endorses increased stress and anxiety in the last year.  passed away suddenly last summer. Her son has Aspergers. She sees psychiatrist and is on celexa and xanax. She does report getting panic attacks often.   She denies any hx of stroke. She started a shot for allergies 1 month ago.   Of note, she has a hx of migraine headaches at the age of 39.   Meds: Metformin, Atorvastatin, hydrochlorothiazide, Alendronate, Allegra, Nexium, ASA 81, Rybelus, Celexa, Montelukast, Lisinopril, metoprolol, xanaz.

## 2024-05-20 NOTE — ASSESSMENT
[FreeTextEntry1] : Assessment/Plan:  64 year old female w/ episodic vertigo-  Presentation most c/w peripheral vertigo i/s/o chronic sinus infections +/- BPPV.  Low suspicion for central vertigo, however given symptoms of L body tremors at the time the vertigo symptoms occurred, will obtain MRI brain to rule out posterior fossa pathology (ischemic stroke/mass lesions/demyelination).  Will also give a prescription for meclizine PRN for vertigo. If MR brain is negative, the "tremors" could have been psychogenic given reported heightened anxiety/stress.  Continue to follow with ENT/Allergist and psychiatrist.    Return to clinic as needed. Will contact pt with results of MR scan.   The above plan was discussed with MINA JANG in great detail.  MINA JANG verbalized understanding and agrees with plan as detailed above. Patient was provided education and counselling on current diagnosis/symptoms. She was advised to call our clinic at [phone] for any new or worsening symptoms, or with any questions or concerns. In case of acute onset of neurological symptoms or worsening presentation, patient was advised to present to nearest emergency room for further evaluation. MINA JANG expressed understanding and all her questions/concerns were addressed.  Kat Wallace M.D

## 2024-08-05 ENCOUNTER — NON-APPOINTMENT (OUTPATIENT)
Age: 64
End: 2024-08-05

## 2024-08-15 ENCOUNTER — NON-APPOINTMENT (OUTPATIENT)
Age: 64
End: 2024-08-15

## 2024-09-06 ENCOUNTER — APPOINTMENT (OUTPATIENT)
Dept: OBGYN | Facility: CLINIC | Age: 64
End: 2024-09-06
Payer: COMMERCIAL

## 2024-09-06 VITALS
SYSTOLIC BLOOD PRESSURE: 121 MMHG | WEIGHT: 265 LBS | HEART RATE: 70 BPM | BODY MASS INDEX: 44.15 KG/M2 | DIASTOLIC BLOOD PRESSURE: 81 MMHG | HEIGHT: 65 IN

## 2024-09-06 DIAGNOSIS — Z01.419 ENCOUNTER FOR GYNECOLOGICAL EXAMINATION (GENERAL) (ROUTINE) W/OUT ABNORMAL FINDINGS: ICD-10-CM

## 2024-09-06 PROCEDURE — 99459 PELVIC EXAMINATION: CPT

## 2024-09-06 PROCEDURE — 99387 INIT PM E/M NEW PAT 65+ YRS: CPT

## 2024-09-08 NOTE — PLAN
[FreeTextEntry1] : 65 y/o F presents for annual visit.   #HM -Pap with HPV today -Mammo requisition given  -Colonoscopy up to date  -DEXA requisition provided   RTO 1 year or PRN esvin CHAVIS

## 2024-09-08 NOTE — PHYSICAL EXAM
[Chaperone Present] : A chaperone was present in the examining room during all aspects of the physical examination [47290] : A chaperone was present during the pelvic exam. [FreeTextEntry2] : MAYITO Arteaga [Appropriately responsive] : appropriately responsive [No Acute Distress] : no acute distress [Alert] : alert [No Lymphadenopathy] : no lymphadenopathy [Regular Rate Rhythm] : regular rate rhythm [No Murmurs] : no murmurs [Clear to Auscultation B/L] : clear to auscultation bilaterally [Soft] : soft [Non-tender] : non-tender [Non-distended] : non-distended [No HSM] : No HSM [No Lesions] : no lesions [No Mass] : no mass [Oriented x3] : oriented x3 [Examination Of The Breasts] : a normal appearance [No Masses] : no breast masses were palpable [Labia Majora] : normal [Labia Minora] : normal [Atrophy] : atrophy [Normal] : normal [Enlarged ___ wks] : not enlarged [Tenderness] : nontender [Uterine Adnexae] : normal

## 2024-09-08 NOTE — HISTORY OF PRESENT ILLNESS
[FreeTextEntry1] : 65 y/o F presents for annual visit. No GYN complaints. Pt is undergoing cardiac workup Menopause at age 54, denies PMB Not sexually active   OBHx: LTCS x2, septic missed AB  GynHx: denies  HM Pap 2023 WNL per pt, denies abnormal  Mammo a few years ago  Colonoscopy 2022  DEXA few years ago

## 2024-09-08 NOTE — PLAN
[FreeTextEntry1] : 63 y/o F presents for annual visit.   #HM -Pap with HPV today -Mammo requisition given  -Colonoscopy up to date  -DEXA requisition provided   RTO 1 year or PRN esvin CHAVIS

## 2024-09-08 NOTE — PHYSICAL EXAM
[Chaperone Present] : A chaperone was present in the examining room during all aspects of the physical examination [02000] : A chaperone was present during the pelvic exam. [FreeTextEntry2] : MAYITO Arteaga [Appropriately responsive] : appropriately responsive [Alert] : alert [No Acute Distress] : no acute distress [No Lymphadenopathy] : no lymphadenopathy [Regular Rate Rhythm] : regular rate rhythm [No Murmurs] : no murmurs [Clear to Auscultation B/L] : clear to auscultation bilaterally [Soft] : soft [Non-tender] : non-tender [Non-distended] : non-distended [No HSM] : No HSM [No Lesions] : no lesions [No Mass] : no mass [Oriented x3] : oriented x3 [Examination Of The Breasts] : a normal appearance [No Masses] : no breast masses were palpable [Labia Majora] : normal [Labia Minora] : normal [Atrophy] : atrophy [Normal] : normal [Enlarged ___ wks] : not enlarged [Tenderness] : nontender [Uterine Adnexae] : normal

## 2024-09-08 NOTE — HISTORY OF PRESENT ILLNESS
[FreeTextEntry1] : 63 y/o F presents for annual visit. No GYN complaints. Pt is undergoing cardiac workup Menopause at age 54, denies PMB Not sexually active   OBHx: LTCS x2, septic missed AB  GynHx: denies  HM Pap 2023 WNL per pt, denies abnormal  Mammo a few years ago  Colonoscopy 2022  DEXA few years ago

## 2024-09-08 NOTE — PHYSICAL EXAM
[Chaperone Present] : A chaperone was present in the examining room during all aspects of the physical examination [45675] : A chaperone was present during the pelvic exam. [FreeTextEntry2] : MAYITO Arteaga [Appropriately responsive] : appropriately responsive [Alert] : alert [No Acute Distress] : no acute distress [No Lymphadenopathy] : no lymphadenopathy [Regular Rate Rhythm] : regular rate rhythm [No Murmurs] : no murmurs [Clear to Auscultation B/L] : clear to auscultation bilaterally [Soft] : soft [Non-tender] : non-tender [Non-distended] : non-distended [No HSM] : No HSM [No Lesions] : no lesions [No Mass] : no mass [Oriented x3] : oriented x3 [Examination Of The Breasts] : a normal appearance [No Masses] : no breast masses were palpable [Labia Majora] : normal [Labia Minora] : normal [Atrophy] : atrophy [Normal] : normal [Enlarged ___ wks] : not enlarged [Tenderness] : nontender [Uterine Adnexae] : normal

## 2024-09-09 LAB — HPV HIGH+LOW RISK DNA PNL CVX: NOT DETECTED

## 2024-09-10 ENCOUNTER — NON-APPOINTMENT (OUTPATIENT)
Age: 64
End: 2024-09-10

## 2024-09-11 LAB — CYTOLOGY CVX/VAG DOC THIN PREP: NORMAL

## 2024-12-14 ENCOUNTER — NON-APPOINTMENT (OUTPATIENT)
Age: 64
End: 2024-12-14

## 2025-06-03 ENCOUNTER — NON-APPOINTMENT (OUTPATIENT)
Age: 65
End: 2025-06-03

## 2025-07-02 ENCOUNTER — NON-APPOINTMENT (OUTPATIENT)
Age: 65
End: 2025-07-02